# Patient Record
Sex: FEMALE | Race: WHITE
[De-identification: names, ages, dates, MRNs, and addresses within clinical notes are randomized per-mention and may not be internally consistent; named-entity substitution may affect disease eponyms.]

---

## 2018-03-09 ENCOUNTER — HOSPITAL ENCOUNTER (OUTPATIENT)
Dept: HOSPITAL 74 - FASU | Age: 58
Discharge: HOME | End: 2018-03-09
Attending: ORTHOPAEDIC SURGERY
Payer: COMMERCIAL

## 2018-03-09 VITALS — SYSTOLIC BLOOD PRESSURE: 136 MMHG | DIASTOLIC BLOOD PRESSURE: 76 MMHG | HEART RATE: 69 BPM

## 2018-03-09 VITALS — BODY MASS INDEX: 30 KG/M2

## 2018-03-09 VITALS — TEMPERATURE: 97.7 F
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
## 2018-03-09 DIAGNOSIS — S83.281A: ICD-10-CM

## 2018-03-09 DIAGNOSIS — M65.861: ICD-10-CM
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
## 2018-03-09 DIAGNOSIS — S83.241A: Primary | ICD-10-CM
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
## 2018-03-09 DIAGNOSIS — S83.8X1A: ICD-10-CM

## 2018-03-09 PROCEDURE — 0SBC4ZZ EXCISION OF RIGHT KNEE JOINT, PERCUTANEOUS ENDOSCOPIC APPROACH: ICD-10-PCS | Performed by: ORTHOPAEDIC SURGERY

## 2018-03-11 NOTE — OP
DATE OF OPERATION:  03/09/2018

 

SURGEON:  Emmanuel Cox MD

 

ASSISTANT:  PIERRE Cruz

 

PREOPERATIVE DIAGNOSES:   

1.  Right knee mediolateral meniscal tear.

2.  Right knee cartilage injury. 

3.  Right knee synovitis.  

 

POSTOPERATIVE DIAGNOSES:    

1.  Right knee mediolateral meniscal tear.

2.  Right knee cartilage injury. 

3.  Right knee synovitis.  

 

PROCEDURE:  

1.  Right knee arthroscopy and partial meniscectomy of mediolateral meniscus. 

2.  Right knee arthroscopy with chondroplasty and abrasioplasty.

3.  Right knee arthroscopy with synovectomy including removal of medial plica.  CPT

code 17742, [id number], [id number].

 

FINDINGS: 

1.  Medial meniscus root and posterior horn tear. 

2.  Lateral meniscus anterior horn tear. 

3.  Central grade 2-3 cartilage injury of medial femoral condyle. 

4.  ACL and PCL intact.  

5.  Minimal cartilage changes of the lateral joint line. 

6.  Central grade 2 changes of patella with anterior 2-4 changes at the site of

medial plica adhesions of patellofemoral trochlea.  

 

PROCEDURE:  Informed consent was obtained.  The patient came to the operating room,

where the lower extremity was prepped and draped in a sterile fashion.  A tourniquet

was placed on the upper thigh, but not inflated. 

 

Using standard arthroscopic technique, a lateral incision and portal was made to

allow for introduction of the camera into the suprapatellar bursa.  This was then

taken to the medial joint line, where under direct visualization, a medial incision

and portal was made.

 

Excessive synovium noted in the medial, lateral and patellofemoral and notch area was

removed by an up-biter, shaver and Bovie cautery.  This was found to bring in

inflammatory tissue into the joint surface, a source of pain and dysfunction. 

 

Probing of the medial and lateral meniscus found tears, as described in the findings.

 These were removed with the up-biter and shaver and taken back to a stable rim. 

 

Grade 2 to 3 degenerative changes were treated with a chondroplasty, removing all

flaking surfaces with low-setting Bovie along the periphery to prevent further

flaking.  

 

Grade 4 changes, as noted, were treated with an abrasioplasty, creating a bleeding

surface at the bone/cartilage interface. Aggressive debridement with shaver/henry

created bleeding surface.  Microfracture also done when indicated in findings

 

All areas of the knee were once again reexamined.  The knee was then drained and a

single suture was placed in all portals.  A sterile dressing was placed and the

patient was transferred to the recovery room without complication. 

 

 

EMMANUEL COX M.D.

 

[id number]

DD: 03/11/2018 22:19

DT: 03/11/2018 22:51

Job #:  [id number]